# Patient Record
Sex: MALE | Race: WHITE | URBAN - METROPOLITAN AREA
[De-identification: names, ages, dates, MRNs, and addresses within clinical notes are randomized per-mention and may not be internally consistent; named-entity substitution may affect disease eponyms.]

---

## 2017-12-22 ENCOUNTER — OFFICE VISIT (OUTPATIENT)
Dept: URGENT CARE | Facility: URGENT CARE | Age: 32
End: 2017-12-22
Payer: COMMERCIAL

## 2017-12-22 VITALS
TEMPERATURE: 98.7 F | DIASTOLIC BLOOD PRESSURE: 92 MMHG | SYSTOLIC BLOOD PRESSURE: 142 MMHG | HEART RATE: 84 BPM | BODY MASS INDEX: 26.22 KG/M2 | OXYGEN SATURATION: 98 % | WEIGHT: 188 LBS

## 2017-12-22 DIAGNOSIS — L03.116 CELLULITIS OF HIP, LEFT: Primary | ICD-10-CM

## 2017-12-22 PROCEDURE — 99213 OFFICE O/P EST LOW 20 MIN: CPT | Performed by: FAMILY MEDICINE

## 2017-12-22 RX ORDER — CEPHALEXIN 500 MG/1
500 CAPSULE ORAL 3 TIMES DAILY
Qty: 21 CAPSULE | Refills: 0 | Status: SHIPPED | OUTPATIENT
Start: 2017-12-22 | End: 2017-12-29

## 2017-12-22 NOTE — PROGRESS NOTES
SUBJECTIVE:   Oni Graham is a 32 year old male presenting with a chief complaint of redness at the site of an ingrown hair at the left hip area (that is under the belt).  No fevers.  No pus.  Patient used tweezers and rubbing alcohol and small knife to make a small incision yesterday.  No pus.  There was some blood that came out.    Onset of symptoms was a couple of days ago.      Past Medical History:   Diagnosis Date     Chronic sinusitis      Raynaud disease      Current Outpatient Prescriptions   Medication Sig Dispense Refill     levofloxacin (QUIXIN) 0.5 % ophthalmic solution Instill 1-2 drops in the left eye every 2 hours while awake for 2 days and then 2 drops 4x per day for 5 days (Patient not taking: Reported on 12/22/2017) 1 Bottle 0     Social History   Substance Use Topics     Smoking status: Never Smoker     Smokeless tobacco: Never Used     Alcohol use Yes      Comment: 1-2 per day       ROS:  Review of systems negative except as stated above.    OBJECTIVE:  BP (!) 142/92 (BP Location: Right arm, Patient Position: Chair, Cuff Size: Adult Regular)  Pulse 84  Temp 98.7  F (37.1  C) (Oral)  Wt 188 lb (85.3 kg)  SpO2 98%  BMI 26.22 kg/m2  GENERAL APPEARANCE: healthy, alert and no distress  SKIN: Left anterior hip has an area of confluent erythema without fluctuance.  No obvious hair was overlying the lesion.        ASSESSMENT:  Cellulitis at the left hip.     PLAN:  Rx:  Cephalexin  Apply warmth onto the lesion.   follow up with your primary care provider if there is spreading redness or if fevers appear.     See orders in Epic    Mehdi Ramirez MD

## 2017-12-22 NOTE — MR AVS SNAPSHOT
"              After Visit Summary   12/22/2017    Oni Graham    MRN: [id number]           Patient Information     Date Of Birth          1985        Visit Information        Provider Department      12/22/2017 3:10 PM Mehdi Ramirez MD [facility]        Today's Diagnoses     Cellulitis of hip, left    -  1      Care Instructions    Place warmth onto the red lesion for 10-15 minutes at a time, every 2-3 hours while awake.      You may continue applying the antibiotic onto the wound.  Continue this until a scab forms.      follow up if any fevers appear or if there is spreading redness from the wound.            Follow-ups after your visit        Who to contact     If you have questions or need follow up information about today's clinic visit or your schedule please contact [facility] directly at [phone].  Normal or non-critical lab and imaging results will be communicated to you by SchoolFeedhart, letter or phone within 4 business days after the clinic has received the results. If you do not hear from us within 7 days, please contact the clinic through SchoolFeedhart or phone. If you have a critical or abnormal lab result, we will notify you by phone as soon as possible.  Submit refill requests through [employer] or call your pharmacy and they will forward the refill request to us. Please allow 3 business days for your refill to be completed.          Additional Information About Your Visit        MyChart Information     Vhall lets you send messages to your doctor, view your test results, renew your prescriptions, schedule appointments and more. To sign up, go to [email or web address] . Click on \"Log in\" on the left side of the screen, which will take you to the Welcome page. Then click on \"Sign up Now\" on the right side of the page.     You will be asked to enter the access code listed below, as well as some personal information. Please follow the directions to create your username and " password.     Your access code is: [email or web address]  Expires: 3/22/2018  4:41 PM     Your access code will  in 90 days. If you need help or a new code, please call your [place] clinic or [phone].        Care EveryWhere ID     This is your Care EveryWhere ID. This could be used by other organizations to access your [place] medical records  [id number]        Your Vitals Were     Pulse Temperature Pulse Oximetry BMI (Body Mass Index)          84 98.7  F (37.1  C) (Oral) 98% 26.22 kg/m2         Blood Pressure from Last 3 Encounters:   17 (!) 142/92   12/29/15 (!) 167/104   10/25/15 (!) 152/100    Weight from Last 3 Encounters:   17 188 lb (85.3 kg)   12/29/15 180 lb (81.6 kg)   10/25/15 180 lb (81.6 kg)              Today, you had the following     No orders found for display         Today's Medication Changes          These changes are accurate as of: 17  4:41 PM.  If you have any questions, ask your nurse or doctor.               Start taking these medicines.        Dose/Directions    cephALEXin 500 MG capsule   Commonly known as:  KEFLEX   Used for:  Cellulitis of hip, left   Started by:  Mehdi Ramirez MD        Dose:  500 mg   Take 1 capsule (500 mg) by mouth 3 times daily for 7 days   Quantity:  21 capsule   Refills:  0            Where to get your medicines      These medications were sent to [facility] [address] [place] [address] AT [facility] [address]  [address] [place] [id number]     Phone:  [phone]     cephALEXin 500 MG capsule                Primary Care Provider Office Phone # Fax #    Davian Fleming [phone]       [address] [place] [address]        Equal Access to Services     [facility]: Basilio metzger Sojustin, waaxda luqadaha, qaybta kaalfletcher andrews, reid alvarado. [facility] [phone].    ATENCIÓN: Si habla nola, tiene a andrea disposición servicios  luis de asistencia lingüística. Yayo lezama [phone].    We comply with applicable federal civil rights laws and [place] laws. We do not discriminate on the basis of race, color, national origin, age, disability, sex, sexual orientation, or gender identity.            Thank you!     Thank you for choosing [facility]  for your care. Our goal is always to provide you with excellent care. Hearing back from our patients is one way we can continue to improve our services. Please take a few minutes to complete the written survey that you may receive in the mail after your visit with us. Thank you!             Your Updated Medication List - Protect others around you: Learn how to safely use, store and throw away your medicines at www.disposemymeds.org.          This list is accurate as of: 12/22/17  4:41 PM.  Always use your most recent med list.                   Brand Name Dispense Instructions for use Diagnosis    cephALEXin 500 MG capsule    KEFLEX    21 capsule    Take 1 capsule (500 mg) by mouth 3 times daily for 7 days    Cellulitis of hip, left       levofloxacin 0.5 % ophthalmic solution    QUIXIN    1 Bottle    Instill 1-2 drops in the left eye every 2 hours while awake for 2 days and then 2 drops 4x per day for 5 days

## 2017-12-22 NOTE — PATIENT INSTRUCTIONS
Place warmth onto the red lesion for 10-15 minutes at a time, every 2-3 hours while awake.      You may continue applying the antibiotic onto the wound.  Continue this until a scab forms.      follow up if any fevers appear or if there is spreading redness from the wound.

## 2018-08-10 ENCOUNTER — HOSPITAL ENCOUNTER (EMERGENCY)
Facility: CLINIC | Age: 33
Discharge: HOME | End: 2018-08-11
Attending: EMERGENCY MEDICINE | Admitting: EMERGENCY MEDICINE
Payer: COMMERCIAL

## 2018-08-10 VITALS
RESPIRATION RATE: 16 BRPM | HEART RATE: 79 BPM | SYSTOLIC BLOOD PRESSURE: 148 MMHG | OXYGEN SATURATION: 99 % | BODY MASS INDEX: 25.9 KG/M2 | HEIGHT: 71 IN | DIASTOLIC BLOOD PRESSURE: 111 MMHG | TEMPERATURE: 97.6 F | WEIGHT: 185 LBS

## 2018-08-10 DIAGNOSIS — J02.9 SORE THROAT: ICD-10-CM

## 2018-08-10 LAB
DEPRECATED S PYO AG THROAT QL EIA: NORMAL
SPECIMEN SOURCE: NORMAL

## 2018-08-10 PROCEDURE — 87081 CULTURE SCREEN ONLY: CPT | Performed by: EMERGENCY MEDICINE

## 2018-08-10 PROCEDURE — 87880 STREP A ASSAY W/OPTIC: CPT | Performed by: EMERGENCY MEDICINE

## 2018-08-10 PROCEDURE — 99283 EMERGENCY DEPT VISIT LOW MDM: CPT

## 2018-08-10 ASSESSMENT — ENCOUNTER SYMPTOMS
MYALGIAS: 1
ACTIVITY CHANGE: 1
SORE THROAT: 1
CHILLS: 1
VOMITING: 0
COUGH: 0
NAUSEA: 0
APPETITE CHANGE: 1
FEVER: 0

## 2018-08-10 NOTE — ED AVS SNAPSHOT
[facility] Emergency Department    [address]    [facility] [address]    Phone:  [phone]    Fax:  [phone]                                       Oni Graham   MRN: [id number]    Department:  [facility] Emergency Department   Date of Visit:  8/10/2018           After Visit Summary Signature Page     I have received my discharge instructions, and my questions have been answered. I have discussed any challenges I see with this plan with the nurse or doctor.    ..........................................................................................................................................  Patient/Patient Representative Signature      ..........................................................................................................................................  Patient Representative Print Name and Relationship to Patient    ..................................................               ................................................  Date                                            Time    ..........................................................................................................................................  Reviewed by Signature/Title    ...................................................              ..............................................  Date                                                            Time

## 2018-08-10 NOTE — ED AVS SNAPSHOT
[facility] Emergency Department    [address]    [facility] [address]    Phone:  [phone]    Fax:  [phone]                                       Oni Graham   MRN: [id number]    Department:  [facility] Emergency Department   Date of Visit:  8/10/2018           Patient Information     Date Of Birth          1985        Your diagnoses for this visit were:     Sore throat        You were seen by Rosemarie Kelley MD.      Follow-up Information     Follow up with Davian Fleming MD In 5 days.    Specialty:  Internal Medicine    Why:  if not improved    Contact information:    [address] [facility] [address]  [phone]          Follow up with [facility] Emergency [facility].    Specialty:  EMERGENCY MEDICINE    Why:  If symptoms worsen    Contact information:    [address]  [facility] [address]  [phone]        Discharge Instructions       Apply lotrimin cream to right arm pit and groin 2x per day for 2 weeks.    Discharge Instructions  Sore Throat  You were seen today for a sore throat.  Most (>80%) sore throats are caused by a virus. Antibiotics do not help with viral infections, but you can fight off the virus on your own.  In this case, your sore throat would be treated with medications for your pain and fever.    Strep throat is a kind of sore throat caused by Group A streptococcus bacteria.  This type of sore throat is treated with antibiotics.  If you had a rapid test done today for strep throat and it did not show infection, a culture is done in some cases. The culture can take several days to complete. If the culture shows you have strep throat, we will call you and get you a prescription for antibiotics. We will not contact you with a negative culture result.  Generally, every Emergency Department visit should have a follow-up clinic visit with either a primary or a specialty  clinic/provider. Please follow-up as instructed by your emergency provider today.  Return to the Emergency Department if:    If you have difficulty breathing.    If you are drooling because you are unable to swallow.    You become dehydrated due to difficulty drinking. Signs of dehydration include weakness, dry mouth, and urinating less than 3 times per day.    If you develop swelling of the neck or tongue.    If you develop a high fever with either severe or unusual headache or stiff neck.    Treatment:      Pain relief -- Non-prescription pain medications, such as Tylenol  (acetaminophen) or Motrin , Advil  (ibuprofen) are usually recommended for pain.  Do not use a medicine that you are allergic to, or if your provider has told you not to use it.    Soft or liquid diet. Concentrate on liquids to keep yourself hydrated. Cold liquids (popsicles, ice cream, etc.) may feel good on your throat.  If you were given a prescription for medicine here today, be sure to read all of the information (including the package insert) that comes with your prescription.  This will include important information about the medicine, its side effects, and any warnings that you need to know about.  The pharmacist who fills the prescription can provide more information and answer questions you may have about the medicine.  If you have questions or concerns that the pharmacist cannot address, please call or return to the Emergency Department.   Remember that you can always come back to the Emergency Department if you are not able to see your regular provider in the amount of time listed above, if you get any new symptoms, or if there is anything that worries you.      24 Hour Appointment Hotline       To make an appointment at any [facility], call [phone] ([phone]). If you don't have a family doctor or clinic, we will help you find one. [facility] clinics are conveniently located to serve the needs of you and your  family.             Review of your medicines      Our records show that you are taking the medicines listed below. If these are incorrect, please call your family doctor or clinic.        Dose / Directions Last dose taken    levofloxacin 0.5 % ophthalmic solution   Commonly known as:  QUIXIN   Quantity:  1 Bottle        Instill 1-2 drops in the left eye every 2 hours while awake for 2 days and then 2 drops 4x per day for 5 days   Refills:  0                Procedures and tests performed during your visit     Beta strep group A culture    Rapid strep screen      Orders Needing Specimen Collection     None      Pending Results     Date and Time Order Name Status Description    8/10/2018 2300 Beta strep group A culture In process             Pending Culture Results     Date and Time Order Name Status Description    8/10/2018 2300 Beta strep group A culture In process             Pending Results Instructions     If you had any lab results that were not finalized at the time of your Discharge, you can call the ED Lab Result RN at [phone]. You will be contacted by this team for any positive Lab results or changes in treatment. The nurses are available 7 days a week from 10A to 6:30P.  You can leave a message 24 hours per day and they will return your call.        Test Results From Your Hospital Stay        8/10/2018 11:23 PM      Component Results     Component    Specimen Description    Throat    Rapid Strep A Screen    NEGATIVE: No Group A streptococcal antigen detected by immunoassay, await culture report.         8/10/2018 11:23 PM                Clinical Quality Measure: Blood Pressure Screening     Your blood pressure was checked while you were in the emergency department today. The last reading we obtained was  BP: (!) 148/111 . Please read the guidelines below about what these numbers mean and what you should do about them.  If your systolic blood pressure (the top number) is less than 120 and your diastolic  "blood pressure (the bottom number) is less than 80, then your blood pressure is normal. There is nothing more that you need to do about it.  If your systolic blood pressure (the top number) is 120-139 or your diastolic blood pressure (the bottom number) is 80-89, your blood pressure may be higher than it should be. You should have your blood pressure rechecked within a year by a primary care provider.  If your systolic blood pressure (the top number) is 140 or greater or your diastolic blood pressure (the bottom number) is 90 or greater, you may have high blood pressure. High blood pressure is treatable, but if left untreated over time it can put you at risk for heart attack, stroke, or kidney failure. You should have your blood pressure rechecked by a primary care provider within the next 4 weeks.  If your provider in the emergency department today gave you specific instructions to follow-up with your doctor or provider even sooner than that, you should follow that instruction and not wait for up to 4 weeks for your follow-up visit.        Thank you for choosing [facility]       Thank you for choosing [facility] for your care. Our goal is always to provide you with excellent care. Hearing back from our patients is one way we can continue to improve our services. Please take a few minutes to complete the written survey that you may receive in the mail after you visit with us. Thank you!        [employer] Information     [employer] lets you send messages to your doctor, view your test results, renew your prescriptions, schedule appointments and more. To sign up, go to [email or web address] . Click on \"Log in\" on the left side of the screen, which will take you to the Welcome page. Then click on \"Sign up Now\" on the right side of the page.     You will be asked to enter the access code listed below, as well as some personal information. Please follow the directions to create your username and password.     Your access code " is: [email or web address]  Expires: 2018 12:01 AM     Your access code will  in 90 days. If you need help or a new code, please call your [place] clinic or [phone].        Care EveryWhere ID     This is your Care EveryWhere ID. This could be used by other organizations to access your [place] medical records  [id number]        Equal Access to Services     JULIANA SWIFT : Hadii marcy leeo Sojustin, waaxda luqadaha, qaybta kaalmada adeegyada, reid castillo . So [facility] [phone].    ATENCIÓN: Si habla español, tiene a andrea disposición servicios gratuitos de asistencia lingüística. Llame al [phone].    We comply with applicable federal civil rights laws and [place] laws. We do not discriminate on the basis of race, color, national origin, age, disability, sex, sexual orientation, or gender identity.            After Visit Summary       This is your record. Keep this with you and show to your community pharmacist(s) and doctor(s) at your next visit.

## 2018-08-11 NOTE — DISCHARGE INSTRUCTIONS
Apply lotrimin cream to right arm pit and groin 2x per day for 2 weeks.    Discharge Instructions  Sore Throat  You were seen today for a sore throat.  Most (>80%) sore throats are caused by a virus. Antibiotics do not help with viral infections, but you can fight off the virus on your own.  In this case, your sore throat would be treated with medications for your pain and fever.    Strep throat is a kind of sore throat caused by Group A streptococcus bacteria.  This type of sore throat is treated with antibiotics.  If you had a rapid test done today for strep throat and it did not show infection, a culture is done in some cases. The culture can take several days to complete. If the culture shows you have strep throat, we will call you and get you a prescription for antibiotics. We will not contact you with a negative culture result.  Generally, every Emergency Department visit should have a follow-up clinic visit with either a primary or a specialty clinic/provider. Please follow-up as instructed by your emergency provider today.  Return to the Emergency Department if:    If you have difficulty breathing.    If you are drooling because you are unable to swallow.    You become dehydrated due to difficulty drinking. Signs of dehydration include weakness, dry mouth, and urinating less than 3 times per day.    If you develop swelling of the neck or tongue.    If you develop a high fever with either severe or unusual headache or stiff neck.    Treatment:      Pain relief -- Non-prescription pain medications, such as Tylenol  (acetaminophen) or Motrin , Advil  (ibuprofen) are usually recommended for pain.  Do not use a medicine that you are allergic to, or if your provider has told you not to use it.    Soft or liquid diet. Concentrate on liquids to keep yourself hydrated. Cold liquids (popsicles, ice cream, etc.) may feel good on your throat.  If you were given a prescription for medicine here today, be sure to read all  of the information (including the package insert) that comes with your prescription.  This will include important information about the medicine, its side effects, and any warnings that you need to know about.  The pharmacist who fills the prescription can provide more information and answer questions you may have about the medicine.  If you have questions or concerns that the pharmacist cannot address, please call or return to the Emergency Department.   Remember that you can always come back to the Emergency Department if you are not able to see your regular provider in the amount of time listed above, if you get any new symptoms, or if there is anything that worries you.

## 2018-08-11 NOTE — ED PROVIDER NOTES
"  History     Chief Complaint:    HPI   Oni Graham is a 33 year old male who presents with a sore throat. The patient states that last night he began feeling a fullness in the back of his throat and neck and throughout the day today he was feeling fatigued and had worsening pain in his throat as well. He likewise has an itching rash in his axilla area and a new developing area in his groin that preceded the sore throat. However, with this constellation of symptoms the patient presents here tonight out of concern for scarlet fever. He has not had Ibuprofen or Tylenol today. He describes associated body aches, chills, but no measured fever. He otherwise denies congestion, rhinorrhea, significant cough, or nausea/vomiting.     Allergies:  No known drug allergies     Medications:    Propranolol  Prozac    Past Medical History:    Chronic maxillary sinusitis  Raynaud disease  White coat syndrome with hypertension  Anxiety  Allergic rhinitis    Past Surgical History:    ENT surgery, unspecified  Infected vein removal, right arm    Family History:    Diabetes  Hyperlipidemia  Hypertension    Social History:  Smoking status: No  Alcohol use: Yes, daily  PCP: Davian Fleming  Presents to the ED alone  Marital Status:     Review of Systems   Constitutional: Positive for activity change, appetite change and chills. Negative for fever.   HENT: Positive for sore throat. Negative for congestion and ear pain.    Respiratory: Negative for cough.    Gastrointestinal: Negative for nausea and vomiting.   Musculoskeletal: Positive for myalgias.   Skin: Positive for rash.   All other systems reviewed and are negative.      Physical Exam     Patient Vitals for the past 24 hrs:   BP Temp Temp src Pulse Resp SpO2 Height Weight   08/10/18 2227 (!) 148/111 97.6  F (36.4  C) Temporal 79 16 99 % 1.803 m (5' 11\") 83.9 kg (185 lb)     Physical Exam  Gen: alert, answers all questions appropriately.   HEENT: PERRL. No tonsillar exudate " and erythema. No trismus, uvula midline, oropharynx symmetric   Neck: full AROM, no midline tracheal tenderness   Lymph: Mild anterior cervical adenopathy  CV: RRR, no murmurs   Pulm: breath sounds equal, lungs clear  Skin: facial skin normal. Right axilla has an area of redness. Area of redness to right groin fold.   Neuro: CN 5 and 7 normal     Emergency Department Course     Laboratory:  Rapid Strep: Negative  Strep Culture: Pending     Emergency Department Course:  Past medical records, nursing notes, and vitals reviewed.  2251: I performed an exam of the patient and obtained history, as documented above.      Swab as above.    I rechecked the patient.  Findings and plan explained to the Patient. Patient discharged home with instructions regarding supportive care, medications, and reasons to return. The importance of close follow-up was reviewed.      Impression & Plan      Medical Decision Making:  Oin Graham is a 33 year old male who presents for evaluation of a sore throat and clinical evidence of pharyngitis.  The rapid strep test is negative, and formal culture has been set up in the lab. There is no signs of more serious etiology at this time is most likely viral.   I have recommended treatment with analgesics, and we will await formal culture results.  If the culture is positive, an ED physician will call the patient to initiate anti-microbial therapy. Return if increasing pain, change in voice, neck pain, vomiting, fever, or shortness of breath. Follow-up with primary physician if not improving in 3-5 days.   Notably the patient also complains of a small itching rash in his axilla as well as his right groin fold. These were present before his sore throat symptoms started. Exam shows this is consistent with fungal infection and I recommended keeping these areas dry and open as well as applying drying powder to the area when he is unable to. No signs of superimposed infection at this time.    Diagnosis:     ICD-10-CM    1. Sore throat J02.9 Beta strep group A culture       I, Chris Otto, am serving as a scribe at 10:51 PM on 8/10/2018 to document services personally performed by Rosemarie Kelley MD based on my observations and the provider's statements to me.       Rosemarie Kelley MD  08/11/18 1853

## 2018-08-11 NOTE — ED TRIAGE NOTES
Patient complaining of body aches, chills, sore throat, rash in right arm pit and groin and nausea today.  Concerned about strep.  Has not taken Tylenol or Motrin today.      ABCs intact.  Alert and oriented x 3.

## 2018-08-13 LAB
BACTERIA SPEC CULT: NORMAL
SPECIMEN SOURCE: NORMAL